# Patient Record
(demographics unavailable — no encounter records)

---

## 2025-04-03 NOTE — REVIEW OF SYSTEMS
[Heartburn] : heartburn [Negative] : Heme/Lymph [Dyspnea on exertion] : not dyspnea during exertion [Lower Ext Edema] : no extremity edema [Leg Claudication] : no intermittent leg claudication [Palpitations] : no palpitations [Orthopnea] : no orthopnea [Abdominal Pain] : no abdominal pain [Vomiting] : no vomiting [Constipation] : no constipation [Blood in Stool] : no blood in stool [Rash] : no rash [Confusion] : no confusion was observed [Anxiety] : no anxiety

## 2025-04-03 NOTE — HISTORY OF PRESENT ILLNESS
[FreeTextEntry1] : 41 yo female who has evaluated in 2021 due to episodes of chest burning at night.  Strong Family hx of CAD, Afib Pt is here for f/u.  Denies chest pain, no SOB, no edema, no palpitations.  Had episodes of B UEs numbness.  No recent labs   Exercise Stress Echo 1/28/22 EF 67% Negative Stress Echo for ischemia.

## 2025-04-03 NOTE — ASSESSMENT
[FreeTextEntry1] : 42 -yo female who has experienced episodes of bilateral arm numbness. Strong family history of heart disease. Obesity.  Plan: Diet, weight loss discussed. Fasting blood work ordered. Exercise stress echocardiogram. F/u after the tests.  Justin Gooden MD